# Patient Record
Sex: MALE | Race: WHITE | NOT HISPANIC OR LATINO | Employment: FULL TIME | URBAN - METROPOLITAN AREA
[De-identification: names, ages, dates, MRNs, and addresses within clinical notes are randomized per-mention and may not be internally consistent; named-entity substitution may affect disease eponyms.]

---

## 2020-04-08 ENCOUNTER — TELEPHONE (OUTPATIENT)
Dept: FAMILY MEDICINE CLINIC | Facility: CLINIC | Age: 30
End: 2020-04-08

## 2021-02-27 ENCOUNTER — NURSE TRIAGE (OUTPATIENT)
Dept: OTHER | Facility: OTHER | Age: 31
End: 2021-02-27

## 2021-02-27 DIAGNOSIS — Z20.828 EXPOSURE TO SARS VIRUS: Primary | ICD-10-CM

## 2021-02-27 DIAGNOSIS — Z20.828 EXPOSURE TO SARS VIRUS: ICD-10-CM

## 2021-02-27 PROCEDURE — U0003 INFECTIOUS AGENT DETECTION BY NUCLEIC ACID (DNA OR RNA); SEVERE ACUTE RESPIRATORY SYNDROME CORONAVIRUS 2 (SARS-COV-2) (CORONAVIRUS DISEASE [COVID-19]), AMPLIFIED PROBE TECHNIQUE, MAKING USE OF HIGH THROUGHPUT TECHNOLOGIES AS DESCRIBED BY CMS-2020-01-R: HCPCS | Performed by: NURSE PRACTITIONER

## 2021-02-27 PROCEDURE — U0005 INFEC AGEN DETEC AMPLI PROBE: HCPCS | Performed by: NURSE PRACTITIONER

## 2021-02-27 NOTE — TELEPHONE ENCOUNTER
Regarding: symptomatic-covid test-exposure/ headache, body aches  ----- Message from Jennifer Beck sent at 2/27/2021  8:14 AM EST -----  "My wife tested positive    I have headaches, body aches, and tightness in my chest "

## 2021-02-27 NOTE — TELEPHONE ENCOUNTER
Reason for Disposition   [1] COVID-19 infection suspected by caller or triager AND [2] mild symptoms (cough, fever, or others) AND [3] no complications or SOB    Answer Assessment - Initial Assessment Questions  1  Were you within 6 feet or less, for up to 15 minutes or more with a person that has a confirmed COVID-19 test?   Yes exposed to wife    2  What was the date of your exposure? 2/27-Lives in same household    3  Are you experiencing any symptoms attributed to the virus? (Assess for SOB, cough, fever, difficulty breathing)  Headaches, body aches, fatigue, and runny nose    4  HIGH RISK: Do you have any history heart or lung conditions, weakened immune system, diabetes, Asthma, CHF, HIV, COPD, Chemo, renal failure, sickle cell, etc?  Denies    Protocols used: CORONAVIRUS (COVID-19) DIAGNOSED OR SUSPECTED-ADULT-    Pt denies SOB, chest pain, or chest pressure  He states that he will follow up with PCP on Monday on his own

## 2021-02-28 LAB — SARS-COV-2 RNA RESP QL NAA+PROBE: NEGATIVE

## 2021-03-01 ENCOUNTER — TELEPHONE (OUTPATIENT)
Dept: FAMILY MEDICINE CLINIC | Facility: CLINIC | Age: 31
End: 2021-03-01

## 2021-03-01 NOTE — TELEPHONE ENCOUNTER
Called and spoke to pt and he had COVID test 2 days ago, was negative   States that he does not need to be seen and is fine,

## 2021-03-09 LAB — EXT SARS-COV-2: DETECTED

## 2021-03-15 ENCOUNTER — TELEPHONE (OUTPATIENT)
Dept: FAMILY MEDICINE CLINIC | Facility: CLINIC | Age: 31
End: 2021-03-15

## 2021-03-15 NOTE — TELEPHONE ENCOUNTER
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

## 2021-03-17 ENCOUNTER — TELEMEDICINE (OUTPATIENT)
Dept: FAMILY MEDICINE CLINIC | Facility: CLINIC | Age: 31
End: 2021-03-17
Payer: COMMERCIAL

## 2021-03-17 DIAGNOSIS — U07.1 LAB TEST POSITIVE FOR DETECTION OF COVID-19 VIRUS: Primary | ICD-10-CM

## 2021-03-17 DIAGNOSIS — R43.2 LOSS OF TASTE: ICD-10-CM

## 2021-03-17 DIAGNOSIS — G44.52 NEW DAILY PERSISTENT HEADACHE: ICD-10-CM

## 2021-03-17 PROCEDURE — 99213 OFFICE O/P EST LOW 20 MIN: CPT | Performed by: NURSE PRACTITIONER

## 2021-03-17 NOTE — PROGRESS NOTES
COVID-19 Virtual Visit     Assessment/Plan:    Presents in office for follow up positive COVID on  3/8/2021   Positive for headaches and loss of taste   Denies any cough or congestion   Denies any fevers or shortness of breath  Discussed taking vitamin-C up to 1200 mg daily vitamin-D up to 2000 units daily and suggested adding aspirin 81 mg enteric-coated once daily for the next 3-4 weeks  Discussed adequate hydration Tylenol or Motrin as needed for pain and headaches and reportable signs and symptoms reviewed  If any swelling of any  extremities or pain, or any excessive shortness of breath the chest pain ER    Problem List Items Addressed This Visit     None      Visit Diagnoses     Lab test positive for detection of COVID-19 virus    -  Primary    was tested at work will send me results   will need letter to return to work 3/22/2021   I will take care of that as soon as I get results     New daily persistent headache        Loss of taste             Disposition:     I recommended self-quarantine for 14 days and to call back for worsening symptoms or development of shortness of breath  I recommended self-quarantine for 10 days and to watch for symptoms until 14 days after exposure  If patient were to develop symptoms, they should self isolate and call our office for further guidance  I have spent 15 minutes directly with the patient  Greater than 50% of this time was spent in counseling/coordination of care regarding: diagnostic results, prognosis, risks and benefits of treatment options, instructions for management, patient and family education, importance of treatment compliance, risk factor reductions and impressions          Encounter provider Colletta Feathers, CRNP    Provider located at [address] [place] [id number] [phone]    Recent Visits  Date Type Provider Dept   03/15/21 Telephone Cristino [facility] Lori Matters recent visits within past 7 days and meeting all other requirements     Today's Visits  Date Type Provider Dept   03/17/21 Telemedicine Griselda Albe, R [address] today's visits and meeting all other requirements     Future Appointments  No visits were found meeting these conditions  Showing future appointments within next 150 days and meeting all other requirements      This virtual check-in was done via [employer] and patient was informed that this is not a secure, HIPAA-compliant platform  He agrees to proceed  Patient agrees to participate in a virtual check in via telephone or video visit instead of presenting to the office to address urgent/immediate medical needs  Patient is aware this is a billable service  After connecting through [facility], the patient was identified by name and date of birth  Lizzy Alcantar was informed that this was a telemedicine visit and that the exam was being conducted confidentially over secure lines  My office door was closed  No one else was in the room  Lizzy Alcantar acknowledged consent and understanding of privacy and security of the telemedicine visit  I informed the patient that I have reviewed his record in Epic and presented the opportunity for him to ask any questions regarding the visit today  The patient agreed to participate  Subjective:   Lizzy Alcantar is a 31 y o  male who is concerned about COVID-19  Patient's symptoms include fatigue, loss of taste and headache  Patient denies fever, chills, congestion, rhinorrhea, sore throat, cough, shortness of breath, chest tightness, abdominal pain, nausea, vomiting and myalgias  Date of symptom onset: 3/6/2021    Lab Results   Component Value Date    SARSCOV2 Negative 02/27/2021     History reviewed  No pertinent past medical history  History reviewed  No pertinent surgical history    Current Outpatient Medications   Medication Sig Dispense Refill    ISOtretinoin (ACCUTANE PO) Take 40 mg by mouth 2 (two) times a day  No current facility-administered medications for this visit  No Known Allergies    Review of Systems   Constitutional: Positive for fatigue  Negative for chills and fever  HENT: Negative for congestion, rhinorrhea and sore throat  Respiratory: Negative for cough, chest tightness and shortness of breath  Gastrointestinal: Negative for abdominal pain, nausea and vomiting  Musculoskeletal: Negative for myalgias  Neurological: Positive for headaches  Objective: There were no vitals filed for this visit  Physical Exam  Constitutional:       Appearance: Normal appearance  Eyes:      Extraocular Movements: Extraocular movements intact  Neck:      Musculoskeletal: Normal range of motion  Pulmonary:      Effort: Pulmonary effort is normal    Musculoskeletal: Normal range of motion  Neurological:      Mental Status: He is alert and oriented to person, place, and time  Psychiatric:         Mood and Affect: Mood normal          Behavior: Behavior normal        VIRTUAL VISIT DISCLAIMER    Caro Walters acknowledges that he has consented to an online visit or consultation  He understands that the online visit is based solely on information provided by him, and that, in the absence of a face-to-face physical evaluation by the physician, the diagnosis he receives is both limited and provisional in terms of accuracy and completeness  This is not intended to replace a full medical face-to-face evaluation by the physician  Caro Walters understands and accepts these terms

## 2021-03-17 NOTE — PATIENT INSTRUCTIONS
COVID-19 and Chronic Health Conditions   WHAT YOU NEED TO KNOW:   Your chronic condition may increase your risk for COVID-19 or serious problems it can cause, such as pneumonia  Healthcare providers might need to make changes that affect how you usually manage your chronic health condition  Providers may change hours of operation or not have patients come in to be seen  You may not be able to make appointments to get blood drawn or to have tests or procedures  This may continue until the virus that causes COVID-19 is controlled  Until then, you can take steps to manage your condition  The steps will also lower your risk for COVID-19 or the serious problems it causes  DISCHARGE INSTRUCTIONS:   If you think you may be infected with the new coronavirus,  do the following to protect others:  · If emergency care is needed,  tell the  about the possible infection, or call ahead and tell the emergency department  · Call a healthcare provider  for instructions if symptoms are mild  Do not  arrive without calling first  Your provider will need to protect staff members and other patients  · Cover your mouth and nose  while you are getting medical care  This will help lower the risk of infecting others  Call your local emergency number [phone] in the [address] [place] [address],[address]) or emergency department if:   · You have trouble breathing or shortness of breath  · You have chest pain or pressure that lasts longer than 5 minutes  · You become confused or hard to wake  · Your lips or face are blue  · You have a fever of 104°F (40°C) or higher  Call your doctor or healthcare provider if:   · You do not  have symptoms of COVID-19 but had close physical contact within 14 days with someone who tested positive  · You have questions or concerns about your COVID-19 or your chronic condition      The following may increase your risk for serious effects of COVID-19:   · Age 72 years or older    · Obesity, diabetes, cancer, or a liver disease    · Kidney failure, chronic kidney disease, or sickle cell disease    · Lung disease, COPD, moderate-to-severe asthma, cystic fibrosis, or pulmonary fibrosis (scarring in your lungs)    · A severe heart disease, such as coronary artery disease or heart failure    · A brain blood vessel disorder or disease, or a condition such as dementia    · Living in a nursing home or long-term care facility    · Smoking cigarettes    · Hypertension (high blood pressure) or thalassemia    · An immune system problem, HIV or AIDS, or a blood or bone marrow transplant    · Long-term use of certain medicines, such as corticosteroids    · Pregnancy    Manage your chronic health condition during this time:  If you do not have a regular healthcare provider, experts recommend you contact a local [facility] health center or health department  The following can help you manage your condition and prevent COVID-19:  · Get emergency care for your condition if needed  Talk to your healthcare providers about symptoms of your chronic condition that need immediate care  Your providers can help you create a plan or add exacerbation management to your plan  The plan will include when to go to an emergency department and when to call your local emergency number  This will depend on where you live and the services that are available during this time  · Go to dialysis appointments as scheduled  It is important to stay on schedule  You will need to have enough food to be able to follow the emergency diet plan if you must miss a session  The emergency diet needs to be part of the management plan for your condition  · Reschedule any upcoming appointments as needed  Medical facilities may be closed until the new coronavirus is better controlled  This means you may need to reschedule a surgery, procedure, or check-up appointment  If you cannot have a phone or video appointment, you will need to make a new appointment   Some providers may be scheduling appointments several months in advance  Some surgeries and procedures will happen as scheduled  This depends on the medical condition and the reason for the surgery or procedure  You may need to have extra testing for COVID-19 several days before  · Follow any regular management plan you use  Your healthcare provider will tell you if you need to make any changes to your regular management plan  For example, if you have asthma, continue to follow your asthma action plan  If you have diabetes, you may need to check your blood sugar level more often  Stress and illness can make blood sugar levels go up  You may need to adjust medicine such as insulin  If you have a heart condition or high blood pressure, you may need to check your blood pressure more often  Stress and illness can also raise your blood pressure  · Talk to your healthcare providers about your medicines  You may be able to get more than 1 month of medicine at a time  This will lower the number of times you need to go to a pharmacy to get your medicines  Make sure you have enough medicine if you have a condition that can lead to an emergency  Examples include asthma medicines, insulin, or an epinephrine pen  Check the expiration dates on the medicines you currently have  Ask for refills as soon as possible, if needed  If it is not time to refill prescriptions, you may be able to get an emergency supply of some medicines  Medicine plans vary, so ask your healthcare provider or pharmacist for options  · Have supplies available in your home  If possible, get extra supplies you use regularly  Examples include absorbent pads, syringes, and wound cleaning solutions  This will limit the number of trips out of your home to get supplies  · Know the signs and symptoms of COVID-19  Signs and symptoms usually start about 5 days after infection but can take 2 to 14 days  Signs and symptoms range from mild to severe   You may feel like you have the flu or a bad cold  Your chronic health condition may cause some of the same symptoms COVID-19 causes  This can make it hard to know if a symptom is from COVID-19 or your chronic condition  Keep a record of any new or worsening symptom you have  This is especially important if you have a condition that often causes shortness of breath  Your provider can tell you if you should be tested for COVID-19  Information is still being learned  Tell your healthcare provider if you think you were infected but develop signs or symptoms not listed below:    ? A cough    ? Shortness of breath or trouble breathing that may become severe    ? A fever of at least 100 4°F, or 38°C (may be lower in adults 65 or older)    ? Chills that might include shaking    ? Muscle pain, body aches, or a headache    ? A sore throat    ? Suddenly not being able to taste or smell anything    ? Feeling very tired (fatigue)    ? Congestion (stuffy head and nose), or a runny nose    ? Diarrhea, nausea, or vomiting    What you can do to prevent having to go out of your home during this time:   · Ask your healthcare provider for other ways to have appointments  You may be able to have appointments without having to go into the provider's office  Some providers offer phone, video, or other types of appointments  · Have food, medicines, and other supplies delivered  Some pharmacies can send certain medicines to you through the mail  Grocery stores and restaurants may be able to deliver food and other items  If possible, have delivered items placed somewhere  Try not to have someone hand you an item  You will be so close to the person that the virus can spread between you  Wash your hands after you put the delivered items away  · Ask someone to get items you need  The person can get groceries, medicines, or other needed items for you  Choose a person who does not have signs or symptoms of COVID-19 or has tested negative for it   The person should not be waiting for test results  He or she should not have a condition that increases the risk for COVID-19 or serious problems it causes  Lower your risk for COVID-19:  The virus that causes COVID-19 spreads quickly and easily  It mainly spreads through droplets that form when a person talks, coughs, or sneezes  An infected person may also be able to leave the virus on objects and surfaces  Another person can get the virus on his or her hands by touching the object or surface  Infection happens if the person then touches his or her eyes or mouth with unwashed hands  The best way to prevent infection is to avoid anyone who is infected, but this can be hard to do  An infected person can spread the virus before signs or symptoms begin, or even if signs or symptoms never develop  The following are ways to prevent the spread of the virus and lower your risk for COVID-19:      · Wash your hands often throughout the day  Use soap and water  Rub your soapy hands together, lacing your fingers  Wash the front and back of each hand, and in between your fingers  Use the fingers of one hand to scrub under the fingernails of the other hand  Wash for at least 20 seconds  Rinse with warm, running water for several seconds  Then dry your hands with a clean towel or paper towel  Use hand  that contains alcohol if soap and water are not available  Do not touch your eyes, nose, or mouth without washing your hands first  Teach children how to wash their hands  · Cover sneezes and coughs  Turn your face away and cover your mouth and nose with a tissue  Throw the tissue away  Use the bend of your arm if a tissue is not available  Then wash your hands well with soap and water or use hand   Turn your head and cover your face if someone near you is coughing or sneezing  Teach children how to cover a cough or sneeze  Remind them to wash their hands  · Make a habit of not touching your face    If you get the virus on your hands, you can transfer it to your eyes, nose, or mouth and become infected  · Clean and disinfect high-touch surfaces and objects in your home often  Do this regularly, even if you think no one living in or coming to your home is infected with the virus  Surfaces include countertops, cupboard doors, desks, handles, handrails, doorknobs, toilet handles, faucets, chairs, and light switches  Objects include keys, phones, computer keyboards and mice, video games, remote controls, and children's toys  Some surfaces and objects may need to be cleaned several times each day, depending on how often they are used  ? Use disinfecting wipes or a disinfecting solution  You can make a solution by mixing 4 teaspoons of bleach with 1 quart (4 cups) of water  Clean with a sponge or cloth that can be thrown away or washed in hot, soapy water and reused  Clean used dishes and utensils in hot, soapy water or in the   ? Be careful with   Do not use any cleaning or disinfecting products that can trigger an asthma attack or other breathing problems  Open windows or have circulating air as you clean  Do not  mix ammonia with bleach  This will create toxic fumes  Read the labels of all products you use  · Ask about vaccines you may need  No vaccine is available yet for the new coronavirus  It is still a good idea to get other vaccines to help protect your immune system  Get the influenza (flu) vaccine as soon as recommended each year, usually starting in September or October  A flu infection can make COVID-19 worse if you have them at the same time  The flu can also cause signs and symptoms that are similar to COVID-19  Get the pneumonia vaccine if recommended  Your healthcare provider can tell you if you also need other vaccines, and when to get them    Follow social distancing guidelines if you must go out of your home during this time:  Social distancing means people stay far enough apart physically that the virus cannot spread from one person to another  National and local social distancing rules vary  Rules may change over time as restrictions are lifted, but they may return if an outbreak happens where you live  It is important to know and follow all current social distancing rules in your area  The following are general guidelines:  · Limit trips out of your home  Most local guidelines allow leaving the home to buy food or supplies, or to seek medical care if necessary  · Stay at least 6 feet (2 meters) away from anyone who does not live in your home  Do not shake hands with, hug, or kiss a person as a greeting  Stand or walk as far from others as possible, especially around anyone who is sneezing or coughing  If you must use public transportation (such as a bus or subway), try to sit or stand away from others  You can stay safely connected with others through phone calls, e-mail messages, social media websites, and video chats  Check in on anyone who may be having a hard time socially distancing, or who lives alone  Ask administrators at nursing homes or long-term care facilities how you can safely communicate with someone living there  · Wear a cloth face covering (mask) when you must go out  Only do this if your chronic condition does not cause breathing problems  You can make a face covering out of a thick cloth  This will save medical masks for healthcare workers and first responders  Choose fabric that holds its shape after it is washed and dried, such as cotton  Put the top half of a paper coffee filter in the middle of the fabric to create an extra filter for you  Check that you can breathe through the fabric when it is folded into several layers  Fold the fabric into a long band  Put each end through a rubber band or hair tie to create ear loops  Fold the ends of the fabric toward the middle  Hold the covering to your face   Adjust it so it covers your nose and mouth completely  Hook the loops onto your ears to keep the covering in place  The following are important points to remember:     ? Do not use a plastic face shield instead of a cloth covering  A face shield will not protect others from droplets  If a face shield must be used, choose one that wraps around both sides of the face and goes below the chin  Do not use disposable shields more than 1 time  [place] that can be used again need to be cleaned and disinfected between uses  Do not put a face shield or a cloth covering on a  or infant  These increase the risk for sudden infant death syndrome (SIDS)  ? Continue social distancing while you are out  A face covering does not mean you can have close physical contact with others  You still need to stay at least 6 feet (2 meters) away from others  ? Do not touch your face covering or eyes while you are wearing the covering  Your eyes will not be covered by the cloth  You can be infected if the virus is on your hand and you touch your eyes  Wash your hands with soap and water for 20 seconds or use hand  before you remove your face covering  ? Do not remove your face covering to talk, sneeze, or cough  Your face covering will help protect you and others around you  ? Wash face coverings often  Wash them in a washing machine in the warmest water the fabric allows  Make sure the fabric is completely dry before you use the face covering again  Only wear clean coverings when you go out  Use a new coffee filter each time  ? Certain individuals should not use face coverings  Do not put a face covering on anyone who is younger than 2 years  Jonel Larahew children may not be able to breathe through the covering  Do not put a face covering on anyone who cannot remove it by himself or herself  ? You can use a clear face covering if others need to read your lips    A clear covering may be helpful if you communicate with someone who is deaf or hard of hearing  A clear covering is made of cloth but has plastic over the mouth area  This will help the person see your lips more easily  Do not use a plastic face shield instead  ? Do not use face coverings that have breathing valves or vents  Valves or vents on the sides are designed to allow breathed air to go out  This makes breathing easier, but the virus can travel out of the valve or vent and be spread to others  · Do not have anyone over to your home unless it is necessary  It is okay to have medical workers in to provide care  Wear your face covering, and remind medical workers to wear a mask or face covering  Remind them to wash their hands when they arrive and before they leave  Do not  have family members, friends, or neighbors over, even if they are not sick  A person can pass the new virus to others before symptoms of COVID-19 begin  Some people never even develop symptoms  Children commonly have mild symptoms or no symptoms  It is important that you continue social distancing with everyone, including children  It may be hard to tell a child not to hug or kiss you  Explain that this is how he or she can help you stay healthy  · Do not go to someone else's home unless it is necessary  Do not go over to visit, even if the person is lonely  Only go if you need to help him or her  · Avoid large gatherings and crowds  Gatherings or crowds of 10 or more individuals can cause the virus to spread  Examples of gatherings include parties, sporting events, Zoroastrian services, and conferences  Crowds may form at beaches, [place], and tourist attractions  You can continue to protect yourself by staying away from large gatherings and crowds  · Stay safe if you must go out to work  You may have a job only done outside your home that is considered essential  Keep physical distance between you and other workers as much as possible  Follow your employer's rules so everyone stays safe      Help strengthen your immune system:   · Do not smoke  Nicotine and other chemicals in cigarettes and cigars can cause lung damage and increase your risk for infections such as bronchitis or pneumonia  Ask your healthcare provider for information if you currently smoke and need help to quit  E-cigarettes or smokeless tobacco still contain nicotine  Talk to your healthcare provider before you use these products  · Eat a variety of healthy foods  Examples include vegetables, fruits, whole-grain breads and cereals, lean meats and poultry, fish, low-fat dairy products, and cooked beans  Healthy foods contain nutrients that help keep your immune system strong  · Find ways to manage stress  You may be feeling more stressed than usual because of the COVID-19 outbreak  The situation is very stressful to many people  Talk to your healthcare providers about ways to manage stress during this time  Stress can lead to breathing problems or make the problems worse  Stress can trigger an attack or exacerbation of many health conditions  It is important to do things that help you feel more relaxed, such as the following:     ? Pick 1 or 2 times a day to watch the news  Constant news watching can increase your stress levels  ? Talk to a friend on the phone or through a video chat  ? Take a warm, soothing bath  ? Listen to music  ? Exercise can also help relieve stress  This may be hard if your regular gym or outdoor exercise area is closed  If you do not have exercise equipment at home, try walking inside your home  You can walk quickly or turn on music and dance  Follow up with your doctor or healthcare provider as directed: Your providers will tell you when you can come in for tests, procedures, or check-ups  Bring your symptom record with you to all appointments  Write down your questions so you remember to ask them during your visits    For more information:   · Centers for Disease Control and Prevention  [address]   [place] , [address]  Phone: 3- 930 - 0527908  Phone: 1- 762 - 6807568  Web Address: [employer] com br    © Copyright [address] Information is for End User's use only and may not be sold, redistributed or otherwise used for commercial purposes  All illustrations and images included in CareNotes® are the copyrighted property of [employer]  or [facility] Virgen   The above information is an  only  It is not intended as medical advice for individual conditions or treatments  Talk to your doctor, nurse or pharmacist before following any medical regimen to see if it is safe and effective for you

## 2021-04-01 ENCOUNTER — TELEPHONE (OUTPATIENT)
Dept: FAMILY MEDICINE CLINIC | Facility: CLINIC | Age: 31
End: 2021-04-01

## 2021-04-01 NOTE — TELEPHONE ENCOUNTER
Patient called he would like a note to go back to work on 4/6/21    ----he would like it sent to his e mail   [email or web address]

## 2022-06-07 ENCOUNTER — TELEPHONE (OUTPATIENT)
Dept: FAMILY MEDICINE CLINIC | Facility: CLINIC | Age: 32
End: 2022-06-07

## 2023-02-15 ENCOUNTER — OFFICE VISIT (OUTPATIENT)
Dept: URGENT CARE | Facility: CLINIC | Age: 33
End: 2023-02-15

## 2023-02-15 VITALS
SYSTOLIC BLOOD PRESSURE: 114 MMHG | WEIGHT: 211 LBS | TEMPERATURE: 98.9 F | DIASTOLIC BLOOD PRESSURE: 84 MMHG | RESPIRATION RATE: 16 BRPM | HEART RATE: 76 BPM | BODY MASS INDEX: 30.28 KG/M2 | OXYGEN SATURATION: 100 %

## 2023-02-15 DIAGNOSIS — M54.50 ACUTE BILATERAL LOW BACK PAIN WITHOUT SCIATICA: Primary | ICD-10-CM

## 2023-02-15 RX ORDER — CYCLOBENZAPRINE HCL 10 MG
10 TABLET ORAL
Qty: 14 TABLET | Refills: 0 | Status: SHIPPED | OUTPATIENT
Start: 2023-02-15

## 2023-02-15 RX ORDER — METHYLPREDNISOLONE 4 MG/1
TABLET ORAL
Qty: 21 TABLET | Refills: 0 | Status: SHIPPED | OUTPATIENT
Start: 2023-02-15

## 2023-02-15 NOTE — PROGRESS NOTES
[address] [facility]        NAME: Deandre Pakr is a 34 y o  male  : 1990    MRN: [id number]  DATE: February 15, 2023  TIME: 1:21 PM    Assessment and Plan   Acute bilateral low back pain without sciatica [M54 50]  1  Acute bilateral low back pain without sciatica  methylPREDNISolone 4 MG tablet therapy pack    cyclobenzaprine (FLEXERIL) 10 mg tablet            Patient Instructions     Patient Instructions   Take Medrol Dosepak as instructed for the next 5 days, discussed no use of naproxen or ibuprofen while taking this, can take Tylenol if needed  Can take Flexeril as instructed at bedtime  Follow up with PCP in 3-5 days  Proceed to  ER if symptoms worsen  Chief Complaint     Chief Complaint   Patient presents with   • Back Pain     Pt presents with lower back pain that started three weeks ago, increased pain with prolonged sitting;right lateral rib site pain that started two weeks ago; tender to touch         History of Present Illness       Patient is a 78-year-old male presenting today with low back pain x3 weeks  Patient notes approximately 3 weeks ago he awoke 1 morning experiencing some pain or discomfort of his low back, notes the pain was on both sides of his low back and had some radiation into his buttocks, denies any known trauma or injury to his low back that may have precipitated any discomfort but is physically active  States the pain is unchanged over the last few weeks, describes it as a dull achy discomfort, has been taking over-the-counter naproxen and applying a heating pad to his low back which provides temporary relief, notes pain and discomfort is worse with certain movements such as sitting for prolonged periods of time or squatting or bending  Denies numbness, tingling, gait abnormalities, bruising, swelling, dysuria, hematuria, penile discharge or drainage  Review of Systems   Review of Systems   Constitutional: Negative for chills and fever     HENT: Negative for ear pain and sore throat  Eyes: Negative for pain and visual disturbance  Respiratory: Negative for cough and shortness of breath  Cardiovascular: Negative for chest pain and palpitations  Gastrointestinal: Negative for abdominal pain and vomiting  Genitourinary: Negative for dysuria and hematuria  Musculoskeletal: Positive for back pain  Skin: Negative for color change and rash  Neurological: Negative for seizures and syncope  All other systems reviewed and are negative  Current Medications       Current Outpatient Medications:   •  cyclobenzaprine (FLEXERIL) 10 mg tablet, Take 1 tablet (10 mg total) by mouth daily at bedtime, Disp: 14 tablet, Rfl: 0  •  methylPREDNISolone 4 MG tablet therapy pack, Use as directed on package, Disp: 21 tablet, Rfl: 0  •  ISOtretinoin (ACCUTANE PO), Take 40 mg by mouth 2 (two) times a day  (Patient not taking: Reported on 2/15/2023), Disp: , Rfl:     Current Allergies     Allergies as of 02/15/2023   • (No Known Allergies)            The following portions of the patient's history were reviewed and updated as appropriate: allergies, current medications, past family history, past medical history, past social history, past surgical history and problem list      History reviewed  No pertinent past medical history  Past Surgical History:   Procedure Laterality Date   • SHOULDER SURGERY Left        History reviewed  No pertinent family history  Medications have been verified  Objective   /84   Pulse 76   Temp 98 9 °F (37 2 °C)   Resp 16   Wt 95 7 kg (211 lb)   SpO2 100%   BMI 30 28 kg/m²        Physical Exam     Physical Exam  Vitals and nursing note reviewed  Constitutional:       General: He is not in acute distress  Appearance: Normal appearance  HENT:      Head: Normocephalic  Cardiovascular:      Rate and Rhythm: Normal rate  Pulses: Normal pulses     Pulmonary:      Effort: Pulmonary effort is normal  Musculoskeletal:      Comments: No obvious deformity of low back, no bruising, no swelling, no spinous process tenderness, slight TTP to left and right lumbar paravertebral muscles into upper buttock, no spasm elicited, 5 out of 5 strength of lower extremities bilaterally, gross sensation intact, 2+ distal pulses, negative straight leg, negative cross straight leg, findings consistent with muscular strain/sprain   Skin:     General: Skin is warm  Capillary Refill: Capillary refill takes less than 2 seconds  Neurological:      Mental Status: He is alert        Gait: Gait normal

## 2023-02-15 NOTE — PATIENT INSTRUCTIONS
Take Medrol Dosepak as instructed for the next 5 days, discussed no use of naproxen or ibuprofen while taking this, can take Tylenol if needed  Can take Flexeril as instructed at bedtime

## 2023-05-02 ENCOUNTER — OFFICE VISIT (OUTPATIENT)
Dept: FAMILY MEDICINE CLINIC | Facility: CLINIC | Age: 33
End: 2023-05-02

## 2023-05-02 VITALS
WEIGHT: 213 LBS | OXYGEN SATURATION: 97 % | BODY MASS INDEX: 30.49 KG/M2 | TEMPERATURE: 97.5 F | RESPIRATION RATE: 16 BRPM | SYSTOLIC BLOOD PRESSURE: 110 MMHG | HEART RATE: 80 BPM | HEIGHT: 70 IN | DIASTOLIC BLOOD PRESSURE: 70 MMHG

## 2023-05-02 DIAGNOSIS — Z13.6 SCREENING FOR CARDIOVASCULAR CONDITION: ICD-10-CM

## 2023-05-02 DIAGNOSIS — G89.29 CHRONIC BILATERAL LOW BACK PAIN WITH BILATERAL SCIATICA: Primary | ICD-10-CM

## 2023-05-02 DIAGNOSIS — M54.16 LUMBAR RADICULOPATHY: ICD-10-CM

## 2023-05-02 DIAGNOSIS — M54.42 CHRONIC BILATERAL LOW BACK PAIN WITH BILATERAL SCIATICA: Primary | ICD-10-CM

## 2023-05-02 DIAGNOSIS — M54.50 ACUTE BILATERAL LOW BACK PAIN WITHOUT SCIATICA: ICD-10-CM

## 2023-05-02 DIAGNOSIS — Z13.29 SCREENING FOR THYROID DISORDER: ICD-10-CM

## 2023-05-02 DIAGNOSIS — M54.41 CHRONIC BILATERAL LOW BACK PAIN WITH BILATERAL SCIATICA: Primary | ICD-10-CM

## 2023-05-02 RX ORDER — CYCLOBENZAPRINE HCL 10 MG
10 TABLET ORAL
Qty: 14 TABLET | Refills: 0 | Status: SHIPPED | OUTPATIENT
Start: 2023-05-02

## 2023-05-02 RX ORDER — MELOXICAM 15 MG/1
15 TABLET ORAL DAILY
Qty: 30 TABLET | Refills: 0 | Status: SHIPPED | OUTPATIENT
Start: 2023-05-02

## 2023-05-02 NOTE — PROGRESS NOTES
Assessment/Plan:    Given recurrent low back pain with radicular symptoms, referral provided for comprehensive spine program     Exacerbations have previously responded well to corticosteroids and muscle relaxants  Rx sent for Meloxicam and Cyclobenzaprine prn  Advised intermittent FMLA will be provided for 3 month period, but cannot be provided long term  F/u here 1 month or sooner prn    1  Chronic bilateral low back pain with bilateral sciatica  -     Ambulatory Referral to Comprehensive Spine Program; Future  -     meloxicam (MOBIC) 15 mg tablet; Take 1 tablet (15 mg total) by mouth daily    2  Lumbar radiculopathy  -     Ambulatory Referral to Comprehensive Spine Program; Future    3  Acute bilateral low back pain without sciatica  -     cyclobenzaprine (FLEXERIL) 10 mg tablet; Take 1 tablet (10 mg total) by mouth daily at bedtime    4  Screening for cardiovascular condition  -     CBC and differential; Future  -     Comprehensive metabolic panel; Future  -     Lipid panel; Future  -     CBC and differential  -     Comprehensive metabolic panel  -     Lipid panel    5  Screening for thyroid disorder  -     TSH, 3rd generation with Free T4 reflex; Future  -     TSH, 3rd generation with Free T4 reflex          Patient Instructions   Please follow up with physical therapy/comprehensive spine           Return in about 4 weeks (around 5/30/2023) for Annual physical     Subjective:      Patient ID: Adolfo Hand is a 34 y o  male  Chief Complaint   Patient presents with    Back Pain     [facility]       Here today with complaints of recurrent low back pain  Reports he had a left shoulder injury requiring surgery approx 1 year ago, and his back started bothering him around that time when lifting/carrying his kids  Prior to this, he did not have any low back issues or any known injuries  Since then, he has exacerbations  every other month and can't work d/t pain   He is a  and has missed "several days of work d/t flares  Was most recently seen in January and was given a steroid and a muscle relaxant, reports this helped within a week  Usually lasts 5-7 days   Pain is across the whole low back, with pain radiating into bilateral thighs  No associated weakness, numbness, or tingling  Was previously taking Naproxen, which doesn't make much of a difference  Likes to work out, but has had difficulties staying in the rhythm d/t pain  The following portions of the patient's history were reviewed and updated as appropriate: allergies, current medications, past family history, past medical history, past social history, past surgical history and problem list     Review of Systems   Constitutional: Negative  Respiratory: Negative  Cardiovascular: Negative  Gastrointestinal: Negative  Genitourinary: Negative  Musculoskeletal: Positive for back pain  Neurological: Negative for weakness and numbness  Current Outpatient Medications   Medication Sig Dispense Refill    cyclobenzaprine (FLEXERIL) 10 mg tablet Take 1 tablet (10 mg total) by mouth daily at bedtime 14 tablet 0    meloxicam (MOBIC) 15 mg tablet Take 1 tablet (15 mg total) by mouth daily 30 tablet 0     No current facility-administered medications for this visit  Objective:    /70 (BP Location: Left arm, Patient Position: Sitting, Cuff Size: Large)   Pulse 80   Temp 97 5 °F (36 4 °C) (Temporal)   Resp 16   Ht 5' 10\" (1 778 m)   Wt 96 6 kg (213 lb)   SpO2 97%   BMI 30 56 kg/m²        Physical Exam  Vitals and nursing note reviewed  Constitutional:       Appearance: Normal appearance  He is well-developed  Cardiovascular:      Rate and Rhythm: Normal rate and regular rhythm  Heart sounds: Normal heart sounds  No murmur heard  Pulmonary:      Effort: Pulmonary effort is normal       Breath sounds: Normal breath sounds  Musculoskeletal:      Lumbar back: Normal  No tenderness   Normal range of " motion  Negative right straight leg raise test and negative left straight leg raise test    Skin:     General: Skin is warm and dry  Neurological:      Mental Status: He is alert     Psychiatric:         Mood and Affect: Mood normal          Behavior: Behavior normal                 JOSE Wray

## 2023-05-03 ENCOUNTER — TELEPHONE (OUTPATIENT)
Dept: PHYSICAL THERAPY | Facility: OTHER | Age: 33
End: 2023-05-03

## 2023-05-03 ENCOUNTER — TELEPHONE (OUTPATIENT)
Dept: FAMILY MEDICINE CLINIC | Facility: CLINIC | Age: 33
End: 2023-05-03

## 2023-05-03 NOTE — TELEPHONE ENCOUNTER
Adriel Diez saw Fabian Munguia yesterday and he is calling to see if [facility] paperwork ready for  ?

## 2023-05-03 NOTE — TELEPHONE ENCOUNTER
Call placed to the patient per Comprehensive Spine Program referral     V/M left for patient to call back  Phone number and hours of business provided  This is the 1st attempt to reach the patient  Will defer referral per protocol  independent

## 2024-01-19 ENCOUNTER — NURSE TRIAGE (OUTPATIENT)
Age: 34
End: 2024-01-19

## 2024-01-19 ENCOUNTER — OFFICE VISIT (OUTPATIENT)
Dept: FAMILY MEDICINE CLINIC | Facility: CLINIC | Age: 34
End: 2024-01-19
Payer: COMMERCIAL

## 2024-01-19 VITALS
WEIGHT: 224 LBS | DIASTOLIC BLOOD PRESSURE: 70 MMHG | TEMPERATURE: 96.9 F | HEIGHT: 70 IN | HEART RATE: 88 BPM | BODY MASS INDEX: 32.07 KG/M2 | RESPIRATION RATE: 16 BRPM | SYSTOLIC BLOOD PRESSURE: 114 MMHG

## 2024-01-19 DIAGNOSIS — J20.9 ACUTE BRONCHITIS, UNSPECIFIED ORGANISM: Primary | ICD-10-CM

## 2024-01-19 PROCEDURE — 99213 OFFICE O/P EST LOW 20 MIN: CPT | Performed by: NURSE PRACTITIONER

## 2024-01-19 RX ORDER — CODEINE PHOSPHATE/GUAIFENESIN 10-100MG/5
5 LIQUID (ML) ORAL 3 TIMES DAILY PRN
Qty: 120 ML | Refills: 0 | Status: SHIPPED | OUTPATIENT
Start: 2024-01-19 | End: 2024-01-23

## 2024-01-19 RX ORDER — BENZONATATE 200 MG/1
200 CAPSULE ORAL 3 TIMES DAILY PRN
Qty: 20 CAPSULE | Refills: 0 | Status: SHIPPED | OUTPATIENT
Start: 2024-01-19

## 2024-01-19 RX ORDER — AZITHROMYCIN 250 MG/1
TABLET, FILM COATED ORAL
Qty: 6 TABLET | Refills: 0 | Status: SHIPPED | OUTPATIENT
Start: 2024-01-19 | End: 2024-01-24

## 2024-01-19 NOTE — LETTER
January 19, 2024     Patient: Vincenzo Collins  YOB: 1990  Date of Visit: 1/19/2024      To Whom it May Concern:    Vincenzo Collins is under my professional care. Vincenzo was seen in my office on 1/19/2024. He may return to work 1/20/24.    If you have any questions or concerns, please don't hesitate to call.         Sincerely,          JOSE Madden        CC:   No Recipients

## 2024-01-19 NOTE — TELEPHONE ENCOUNTER
Regarding: SOB cough worsening  ----- Message from Jana Andresen sent at 1/19/2024 10:08 AM EST -----  Patient called stating he has been experiencing SOB for the past day or so. He has been coughing a lot and it is starting to become a pain in his chest. I did schedule him an appointment today with PCP as he asked to have his lungs checked, and is unable to come in for an appointment sooner than his appointment time.

## 2024-01-19 NOTE — TELEPHONE ENCOUNTER
Patient called in stating he developed cough/ could around [place] and the cough still lingers.  States he sometimes has coughing fits and SOB on exertion only. Denies chest pain, fever or wheezing. Denies SOB at rest.   Patient is scheduled for 1145 am today .  Ok to proceed to appointment.

## 2024-01-19 NOTE — PROGRESS NOTES
Assessment/Plan:    Will treat as below.   F/u as needed    1. Acute bronchitis, unspecified organism  -     guaifenesin-codeine (GUAIFENESIN AC) 100-10 MG/5ML liquid; Take 5 mL by mouth 3 (three) times a day as needed for cough for up to 4 days  -     benzonatate (TESSALON) 200 MG capsule; Take 1 capsule (200 mg total) by mouth 3 (three) times a day as needed for cough  -     azithromycin (ZITHROMAX) 250 mg tablet; 2 tabs PO day 1, then 1 tab PO days 2-5          There are no Patient Instructions on file for this visit.    Return if symptoms worsen or fail to improve.    Subjective:      Patient ID: Vincenzo Collins is a 33 y.o. male.    Chief Complaint   Patient presents with   • Cough     States that he has been sick since [place] and has a lingering cough [id number]   • Fatigue       He has been sick for the past 3 weeks with a cough and congestion.  Had fever at onset. Symptoms improved, however has lingering cough.  Worse at nighttime.   Cough is productive in the morning, then more dry throughout the day.    Tried Dayquil OTC        The following portions of the patient's history were reviewed and updated as appropriate: allergies, current medications, past family history, past medical history, past social history, past surgical history and problem list.    Review of Systems   Constitutional:  Negative for chills, fatigue and fever.   HENT:  Negative for congestion, ear pain, postnasal drip, rhinorrhea, sinus pressure and sore throat.    Respiratory:  Positive for cough, chest tightness and shortness of breath (with coughing fits). Negative for wheezing.    Cardiovascular:  Negative for chest pain.   Gastrointestinal:  Negative for abdominal pain, diarrhea, nausea and vomiting.   Musculoskeletal:  Negative for arthralgias.   Skin:  Negative for rash.   Neurological:  Negative for headaches.         Current Outpatient Medications   Medication Sig Dispense Refill   • azithromycin (ZITHROMAX) 250 mg tablet 2  "tabs PO day 1, then 1 tab PO days 2-5 6 tablet 0   • benzonatate (TESSALON) 200 MG capsule Take 1 capsule (200 mg total) by mouth 3 (three) times a day as needed for cough 20 capsule 0   • guaifenesin-codeine (GUAIFENESIN AC) 100-10 MG/5ML liquid Take 5 mL by mouth 3 (three) times a day as needed for cough for up to 4 days 120 mL 0     No current facility-administered medications for this visit.       Objective:    /70   Pulse 88   Temp (!) 96.9 °F (36.1 °C)   Resp 16   Ht 5' 10\" (1.778 m)   Wt 102 kg (224 lb)   BMI 32.14 kg/m²        Physical Exam  Vitals and nursing note reviewed.   Constitutional:       Appearance: Normal appearance. He is well-developed.   HENT:      Right Ear: Tympanic membrane normal.      Left Ear: Tympanic membrane normal.      Nose: Nose normal.      Mouth/Throat:      Pharynx: Oropharynx is clear.   Cardiovascular:      Rate and Rhythm: Normal rate and regular rhythm.      Pulses: Normal pulses.      Heart sounds: Normal heart sounds. No murmur heard.  Pulmonary:      Effort: Pulmonary effort is normal.      Breath sounds: Normal breath sounds.   Skin:     General: Skin is warm and dry.   Neurological:      Mental Status: He is alert.   Psychiatric:         Mood and Affect: Mood normal.         Behavior: Behavior normal.                JOSE Madden  "

## 2024-01-19 NOTE — TELEPHONE ENCOUNTER
"Answer Assessment - Initial Assessment Questions  1. ONSET: \"When did the cough begin?\"         Around joe fever and cough started and lingers      2. SEVERITY: \"How bad is the cough today?\"        moderate    3. SPUTUM: \"Describe the color of your sputum\" (none, dry cough; clear, white, yellow, green)           Greenish brown       4. HEMOPTYSIS: \"Are you coughing up any blood?\" If so ask: \"How much?\" (flecks, streaks, tablespoons, etc.)        denies    5. DIFFICULTY BREATHING: \"Are you having difficulty breathing?\" If Yes, ask: \"How bad is it?\" (e.g., mild, moderate, severe)     - MILD: No SOB at rest, mild SOB with walking, speaks normally in sentences, can lay down, no retractions, pulse < 100.     - MODERATE: SOB at rest, SOB with minimal exertion and prefers to sit, cannot lie down flat, speaks in phrases, mild retractions, audible wheezing, pulse 100-120.     - SEVERE: Very SOB at rest, speaks in single words, struggling to breathe, sitting hunched forward, retractions, pulse > 120         Only on exertion and after coughing spell    6. FEVER: \"Do you have a fever?\" If Yes, ask: \"What is your temperature, how was it measured, and when did it start?\"        denies      7. CARDIAC HISTORY: \"Do you have any history of heart disease?\" (e.g., heart attack, congestive heart failure)         denies    8. LUNG HISTORY: \"Do you have any history of lung disease?\"  (e.g., pulmonary embolus, asthma, emphysema)        denies    9. PE RISK FACTORS: \"Do you have a history of blood clots?\" (or: recent major surgery, recent prolonged travel, bedridden)        denies      10. OTHER SYMPTOMS: \"Do you have any other symptoms?\" (e.g., runny nose, wheezing, chest pain)          denies    Protocols used: Cough-ADULT-[place]    "

## 2024-07-24 ENCOUNTER — OFFICE VISIT (OUTPATIENT)
Dept: FAMILY MEDICINE CLINIC | Facility: CLINIC | Age: 34
End: 2024-07-24
Payer: COMMERCIAL

## 2024-07-24 VITALS
TEMPERATURE: 97.3 F | DIASTOLIC BLOOD PRESSURE: 74 MMHG | WEIGHT: 236.6 LBS | HEART RATE: 78 BPM | HEIGHT: 70 IN | SYSTOLIC BLOOD PRESSURE: 120 MMHG | RESPIRATION RATE: 18 BRPM | BODY MASS INDEX: 33.87 KG/M2

## 2024-07-24 DIAGNOSIS — M54.16 LUMBAR RADICULITIS: Primary | ICD-10-CM

## 2024-07-24 PROCEDURE — 99213 OFFICE O/P EST LOW 20 MIN: CPT | Performed by: INTERNAL MEDICINE

## 2024-07-24 RX ORDER — METHYLPREDNISOLONE 4 MG/1
TABLET ORAL
Qty: 21 EACH | Refills: 0 | Status: SHIPPED | OUTPATIENT
Start: 2024-07-24

## 2024-07-24 RX ORDER — CYCLOBENZAPRINE HCL 10 MG
10 TABLET ORAL 3 TIMES DAILY PRN
Qty: 30 TABLET | Refills: 0 | Status: SHIPPED | OUTPATIENT
Start: 2024-07-24

## 2024-07-24 NOTE — PROGRESS NOTES
Ambulatory Visit  Name: Vincenzo Collins      : 1990      MRN: [id number]  Encounter Provider: Shantal Bergman MD  Encounter Date: 2024   Encounter department: [facility]    Assessment & Plan   1. Lumbar radiculitis  Comments:  Advised moist heat, PT.  FMLA forms completed for intermittent leave.  F/u if not improving.  Orders:  -     methylPREDNISolone 4 MG tablet therapy pack; Use as directed on package  -     cyclobenzaprine (FLEXERIL) 10 mg tablet; Take 1 tablet (10 mg total) by mouth 3 (three) times a day as needed for muscle spasms  -     Ambulatory Referral to Physical Therapy; Future     History of Present Illness     About a week ago, his daughter jumped from the steps and he caught her, he felt a pop in his back.  Pain is bottom right, shoots down posterior thigh.  Sometimes has pins and needles, no weakness. Hard to bend forward to put on shoes or socks.  Taking excedrin with minimal relief. Sneezing hurts. Had an old injury from active service in [place].    The last time this happened, about a year ago, he responded well to steroids and muscle relaxers.  Has a form for FMLA as his job is very strict about calling out sick, works for [employer].     Back Pain  This is a recurrent problem. The current episode started in the past 7 days. The problem occurs daily. The problem is unchanged. The pain is present in the gluteal and sacro-iliac. The quality of the pain is described as aching, shooting and stabbing. The pain radiates to the right thigh. The pain is at a severity of 7/10. The pain is Worse during the night. The symptoms are aggravated by bending and sitting. Stiffness is present All day. Associated symptoms include leg pain, paresthesias and tingling. Pertinent negatives include no abdominal pain, bladder incontinence, bowel incontinence, chest pain, dysuria, fever, headaches, numbness, paresis, pelvic pain, perianal numbness, weakness or weight loss.       Review of  "Systems   Constitutional:  Negative for fever and weight loss.   Cardiovascular:  Negative for chest pain.   Gastrointestinal:  Negative for abdominal pain and bowel incontinence.   Genitourinary:  Negative for bladder incontinence, dysuria and pelvic pain.   Musculoskeletal:  Positive for back pain.   Neurological:  Positive for tingling and paresthesias. Negative for weakness, numbness and headaches.       Objective     /74   Pulse 78   Temp (!) 97.3 °F (36.3 °C)   Resp 18   Ht 5' 10\" (1.778 m)   Wt 107 kg (236 lb 9.6 oz)   BMI 33.95 kg/m²     Physical Exam  Constitutional:       Appearance: Normal appearance.   HENT:      Head: Normocephalic and atraumatic.      Mouth/Throat:      Mouth: Mucous membranes are moist.   Eyes:      Pupils: Pupils are equal, round, and reactive to light.   Cardiovascular:      Rate and Rhythm: Normal rate and regular rhythm.      Heart sounds: No murmur heard.     No friction rub. No gallop.   Pulmonary:      Effort: Pulmonary effort is normal.      Breath sounds: Normal breath sounds. No wheezing, rhonchi or rales.   Abdominal:      General: Abdomen is flat.   Musculoskeletal:         General: No swelling.      Lumbar back: Spasms and tenderness present. Decreased range of motion. Positive right straight leg raise test. Negative left straight leg raise test.      Comments: Strength intact distally   Neurological:      Mental Status: He is alert.      Deep Tendon Reflexes:      Reflex Scores:       Patellar reflexes are 2+ on the right side.       Achilles reflexes are 1+ on the right side and 1+ on the left side.      Administrative Statements           "

## 2025-03-21 ENCOUNTER — TELEPHONE (OUTPATIENT)
Age: 35
End: 2025-03-21

## 2025-03-21 NOTE — TELEPHONE ENCOUNTER
Patient said he needs the FMLA by Monday (if possible) and would like it uploaded to his MyChart once completed.

## 2025-03-21 NOTE — TELEPHONE ENCOUNTER
Call from patient advising it is time to fill out his yearly FMLA forms. He was advised last time that a blank FMLA form had been saved in the system for him so he would like that form to be filled out. Patient advised he works for [employer] and if his back is not at 100% going into work he is putting not only himself but his coworkers in danger. He is asking for form to be updated from last year to advise for either 3/4 days a month or preferably as needed a month due to having more back pain in recent months.     Please call patient back to advise that blank form is here and it is being sent to be filled out or if form needs to be faxed again. Thank you.

## 2025-03-24 NOTE — TELEPHONE ENCOUNTER
Needs appt.  He has not had any additional workup/treatment for his back and needs proper documentation to support need for FMLA.

## 2025-03-25 ENCOUNTER — OFFICE VISIT (OUTPATIENT)
Dept: FAMILY MEDICINE CLINIC | Facility: CLINIC | Age: 35
End: 2025-03-25
Payer: COMMERCIAL

## 2025-03-25 VITALS
DIASTOLIC BLOOD PRESSURE: 70 MMHG | HEIGHT: 70 IN | WEIGHT: 231 LBS | SYSTOLIC BLOOD PRESSURE: 112 MMHG | TEMPERATURE: 97.2 F | RESPIRATION RATE: 18 BRPM | BODY MASS INDEX: 33.07 KG/M2 | HEART RATE: 52 BPM

## 2025-03-25 DIAGNOSIS — M54.16 LUMBAR RADICULOPATHY: Primary | ICD-10-CM

## 2025-03-25 DIAGNOSIS — M54.16 LUMBAR RADICULITIS: ICD-10-CM

## 2025-03-25 PROCEDURE — 99213 OFFICE O/P EST LOW 20 MIN: CPT | Performed by: NURSE PRACTITIONER

## 2025-03-25 RX ORDER — CYCLOBENZAPRINE HCL 10 MG
10 TABLET ORAL 3 TIMES DAILY PRN
Qty: 30 TABLET | Refills: 2 | Status: SHIPPED | OUTPATIENT
Start: 2025-03-25

## 2025-03-25 RX ORDER — METHYLPREDNISOLONE 4 MG/1
TABLET ORAL
Qty: 21 EACH | Refills: 0 | Status: SHIPPED | OUTPATIENT
Start: 2025-03-25

## 2025-03-25 NOTE — PROGRESS NOTES
"Name: Vincenzo Collins      : 1990      MRN: [id number]  Encounter Provider: JOSE Madden  Encounter Date: 3/25/2025   Encounter department: [facility]    Assessment & Plan  Lumbar radiculopathy  Discussed that in order to continue providing intermittent FMLA for his back pain, he needs additional workup diagnostic testing.  Discussed that for step would be to be evaluated through comprehensive spine program, course of physical therapy, followed by imaging.  Forms were completed, however discussed he will need to follow through with this in order to continue to be eligible.  Orders:  •  cyclobenzaprine (FLEXERIL) 10 mg tablet; Take 1 tablet (10 mg total) by mouth 3 (three) times a day as needed for muscle spasms  •  Ambulatory Referral to Comprehensive Spine Program; Future    Lumbar radiculitis    Orders:  •  methylPREDNISolone 4 MG tablet therapy pack; Use as directed on package           History of Present Illness   Following up today on his low back pain.  This has been a chronic issue, which occasionally flares, and causing severe pain and inability to work.  He has had intermittent FMLA to cover him for his sinuses.  Patient states he works approximately 60 to 70 hours/week with overtime and needs FMLA to cover him so his absences are excused.  Most recent flare in November.   Ribs locked up lifting his son out of the bath tub  Back pain started while in the .  Was previously seeing a chiropractor for his pain, has not had any imaging or physical therapy  Occasional right leg radicular pain.         Review of Systems   Musculoskeletal:  Positive for back pain.   Neurological:  Positive for weakness. Negative for numbness.   All other systems reviewed and are negative.      Objective   /70   Pulse (!) 52   Temp (!) 97.2 °F (36.2 °C)   Resp 18   Ht 5' 10\" (1.778 m)   Wt 105 kg (231 lb)   BMI 33.15 kg/m²      Physical Exam  Vitals and nursing note reviewed. "   Constitutional:       General: He is not in acute distress.     Appearance: Normal appearance.   HENT:      Head: Normocephalic and atraumatic.   Eyes:      Conjunctiva/sclera: Conjunctivae normal.   Neck:      Vascular: No carotid bruit.   Cardiovascular:      Rate and Rhythm: Normal rate and regular rhythm.      Pulses: Normal pulses.      Heart sounds: Normal heart sounds. No murmur heard.  Pulmonary:      Effort: Pulmonary effort is normal.      Breath sounds: Normal breath sounds.   Musculoskeletal:      Lumbar back: Normal.   Skin:     General: Skin is warm and dry.   Neurological:      Mental Status: He is alert.   Psychiatric:         Mood and Affect: Mood normal.         Behavior: Behavior normal.

## 2025-03-26 ENCOUNTER — TELEPHONE (OUTPATIENT)
Dept: PHYSICAL THERAPY | Facility: OTHER | Age: 35
End: 2025-03-26

## 2025-03-26 NOTE — TELEPHONE ENCOUNTER
Call placed to the patient per Comprehensive Sp[facility] Program referral.    Spoke with the patient. He was unable to speak due to being at work. Pt will call comp spine back when able to.     PCP ref to [facility] 2023 and 3/25/25    Closed. Will assist the Pt when he calls comp spine back